# Patient Record
Sex: FEMALE | Race: BLACK OR AFRICAN AMERICAN | NOT HISPANIC OR LATINO | Employment: UNEMPLOYED | URBAN - METROPOLITAN AREA
[De-identification: names, ages, dates, MRNs, and addresses within clinical notes are randomized per-mention and may not be internally consistent; named-entity substitution may affect disease eponyms.]

---
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
## 2023-01-31 ENCOUNTER — TELEPHONE (OUTPATIENT)
Dept: FAMILY MEDICINE CLINIC | Facility: CLINIC | Age: 18
End: 2023-01-31

## 2023-01-31 NOTE — TELEPHONE ENCOUNTER
Date of Appointment:01/30/23    Reason Appointment was No Show: LVM    Was Appointment Rescheduled?:No    Was letter sent via [employer] or Mailed? :[facility]

## 2023-02-07 ENCOUNTER — OFFICE VISIT (OUTPATIENT)
Dept: URGENT CARE | Age: 18
End: 2023-02-07

## 2023-02-07 VITALS
OXYGEN SATURATION: 98 % | BODY MASS INDEX: 37.56 KG/M2 | HEIGHT: 64 IN | TEMPERATURE: 98.5 F | WEIGHT: 220 LBS | RESPIRATION RATE: 18 BRPM | HEART RATE: 68 BPM

## 2023-02-07 DIAGNOSIS — K52.9 ENTERITIS: Primary | ICD-10-CM

## 2023-02-07 DIAGNOSIS — J06.9 VIRAL UPPER RESPIRATORY TRACT INFECTION: ICD-10-CM

## 2023-02-07 DIAGNOSIS — R11.2 NAUSEA AND VOMITING, UNSPECIFIED VOMITING TYPE: ICD-10-CM

## 2023-02-07 RX ORDER — ONDANSETRON 8 MG/1
8 TABLET, ORALLY DISINTEGRATING ORAL EVERY 8 HOURS PRN
Qty: 20 TABLET | Refills: 0 | Status: SHIPPED | OUTPATIENT
Start: 2023-02-07

## 2023-02-07 NOTE — PROGRESS NOTES
[facility]        NAME: Tim Zacarias is a 21 y o  female  : 2005    MRN: [id number]  DATE: 2023  TIME: 12:43 PM    Assessment and Plan   Enteritis [K52 9]  1  Enteritis        2  Viral upper respiratory tract infection        3  Nausea and vomiting, unspecified vomiting type  ondansetron (ZOFRAN-ODT) 8 mg disintegrating tablet        --Suspect viral etiology  Address per below  Patient Instructions     --Rest and drink plenty of fluids  Milk, soda, and overly sugar or acidic beverages should be avoided, however  Plain water is acceptable, but dilute juice or a sports/electrolyte drink is preferred  --As tolerated, begin to eat small amounts of bland solids such as crackers, bread, rice, pasta, bananas, or yogurt  Fried, spicy, greasy, and overly acidic foods (such as tomato sauce) should be avoided for now  If you throw up after eating, wait at least 3-4 hours before making another attempt    --OTC Pepto-bismol may help with stomach discomfort, although it will likely turn your stools black  It should also be avoided in persons under age 25 or those with an aspirin allergy  --A prescription anti-nausea medication, such as Zofran, may also be provided  --OTC Immodium should be avoided  Although it will help decrease the amount of diarrhea, it has a tendency to keep the virus in your system longer, therefore prolonging the infection  Frequent use may also lead to rebound constipation  --Expect symptoms to last 3-7 days on average, followed by gradual improvement  It takes time for the virus to leave your system and for your GI tract to heal and resume normal functioning  --Monitor for signs and symptoms of dehydration including increased thirst, dry mouth, lightheadedness, as well as dark/infrequent/small amounts of urination  Should these occur, increase the amount of fluids you are drinking immediately    Should these continue, you should go immediately to the ER as you will likely need IV fluids  --Go to the ER if you experience increased abdominal pain, recurrent vomiting, significant blood in stool or emesis, or signs of dehydration (per above)  --Contact your PCP if your loose stools and stomach upset are still present after 7 days without showing signs of improvement  At this time, further evaluation may be warranted including stool cultures  Chief Complaint     Chief Complaint   Patient presents with   • Flu Symptoms     Bodyaches    back pain    headache    NVD   congestion    x 1 wk         Also reports bl wrist pain and limited rom          History of Present Illness       Here with complaints of headache, body aches, nasal congestion, rhinorrhea, cough, chills, abdominal discomfort, N/V/D x 1 week  Emesis is NBNB  Last episode yesterday  Stools are loose, watery, no melena or hematochezia  Generalized abdominal "pressure"  No fever the past few days  No cough  No neck stiffness  No OTC meds taken  Family member with recent flu  No COVID concerns  Review of Systems   Review of Systems   Constitutional: Negative for fever  HENT: Positive for sore throat  Negative for rhinorrhea  Respiratory: Positive for cough  Gastrointestinal: Positive for abdominal pain, diarrhea, nausea and vomiting  Negative for blood in stool  Musculoskeletal: Negative for myalgias  Neurological: Negative for headaches           Current Medications       Current Outpatient Medications:   •  acetaminophen (TYLENOL) 500 mg tablet, Take 1 tablet (500 mg total) by mouth every 6 (six) hours as needed for mild pain or headaches, Disp: 30 tablet, Rfl: 0  •  ibuprofen (MOTRIN) 400 mg tablet, Take 1 tablet (400 mg total) by mouth every 6 (six) hours as needed for mild pain or headaches, Disp: 30 tablet, Rfl: 0  •  ondansetron (ZOFRAN-ODT) 8 mg disintegrating tablet, Take 1 tablet (8 mg total) by mouth every 8 (eight) hours as needed for nausea or vomiting, Disp: 20 tablet, Rfl: 0    Current Allergies     Allergies as of 02/07/2023 - Reviewed 02/07/2023   Allergen Reaction Noted   • Coconut oil - food allergy Other (See Comments) 05/14/2019   • No active allergies  04/28/2017            The following portions of the patient's history were reviewed and updated as appropriate: allergies, current medications, past family history, past medical history, past social history, past surgical history and problem list      Past Medical History:   Diagnosis Date   • Anemia    • Generalized headaches    • Lateral epicondylitis of left elbow 5/12/2021   • Meningitis     2015   • Migraine    • Pre-diabetes    • Salter-Snow type III fracture of lower end of left tibia 12/13/2016   • Wrist sprain, left, initial encounter 12/12/2018       History reviewed  No pertinent surgical history  Family History   Problem Relation Age of Onset   • No Known Problems Mother    • No Known Problems Father    • Alzheimer's disease Family    • Diabetes Maternal Uncle         Mellitus         Medications have been verified  Objective   Pulse 68   Temp 98 5 °F (36 9 °C)   Resp 18   Ht 5' 4" (1 626 m)   Wt 99 8 kg (220 lb)   LMP 01/12/2023   SpO2 98%   BMI 37 76 kg/m²   Patient's last menstrual period was 01/12/2023  Physical Exam     Physical Exam  Constitutional:       General: She is not in acute distress  Appearance: Normal appearance  She is well-developed  She is not ill-appearing, toxic-appearing or diaphoretic  HENT:      Head: Normocephalic  Right Ear: Tympanic membrane, ear canal and external ear normal       Left Ear: Tympanic membrane, ear canal and external ear normal       Nose: Congestion and rhinorrhea present  Mouth/Throat:      Pharynx: No oropharyngeal exudate or posterior oropharyngeal erythema  Eyes:      General:         Right eye: No discharge  Left eye: No discharge     Cardiovascular:      Rate and Rhythm: Normal rate and regular rhythm  Pulses: Normal pulses  Heart sounds: Normal heart sounds  No murmur heard  Pulmonary:      Effort: Pulmonary effort is normal  No respiratory distress  Breath sounds: Normal breath sounds  No stridor  No wheezing, rhonchi or rales  Chest:      Chest wall: No tenderness  Abdominal:      General: Abdomen is flat  There is no distension  Palpations: Abdomen is soft  There is no mass  Tenderness: There is no abdominal tenderness  Musculoskeletal:      Cervical back: Neck supple  Lymphadenopathy:      Cervical: No cervical adenopathy  Skin:     General: Skin is warm and dry  Neurological:      Mental Status: She is alert and oriented to person, place, and time  Deep Tendon Reflexes: Reflexes are normal and symmetric     Psychiatric:         Mood and Affect: Mood normal

## 2023-02-07 NOTE — LETTER
February 7, 2023     Patient: Kingsley Iyer [place]   YOB: 2005   Date of Visit: 2/7/2023       To Whom it May Concern:    Qi Manjarrez was seen in my clinic on 2/7/2023  Please excuse from school this week due to illness  If you have any questions or concerns, please don't hesitate to call           Sincerely,          [facility]        CC: No Recipients

## 2023-02-07 NOTE — PATIENT INSTRUCTIONS
--Rest and drink plenty of fluids  Milk, soda, and overly sugar or acidic beverages should be avoided, however  Plain water is acceptable, but dilute juice or a sports/electrolyte drink is preferred  --As tolerated, begin to eat small amounts of bland solids such as crackers, bread, rice, pasta, bananas, or yogurt  Fried, spicy, greasy, and overly acidic foods (such as tomato sauce) should be avoided for now  If you throw up after eating, wait at least 3-4 hours before making another attempt    --OTC Pepto-bismol may help with stomach discomfort, although it will likely turn your stools black  It should also be avoided in persons under age 25 or those with an aspirin allergy  --A prescription anti-nausea medication, such as Zofran, may also be provided  --OTC Immodium should be avoided  Although it will help decrease the amount of diarrhea, it has a tendency to keep the virus in your system longer, therefore prolonging the infection  Frequent use may also lead to rebound constipation  --Expect symptoms to last 3-7 days on average, followed by gradual improvement  It takes time for the virus to leave your system and for your GI tract to heal and resume normal functioning  --Monitor for signs and symptoms of dehydration including increased thirst, dry mouth, lightheadedness, as well as dark/infrequent/small amounts of urination  Should these occur, increase the amount of fluids you are drinking immediately  Should these continue, you should go immediately to the ER as you will likely need IV fluids  --Go to the ER if you experience increased abdominal pain, recurrent vomiting, significant blood in stool or emesis, or signs of dehydration (per above)  --Contact your PCP if your loose stools and stomach upset are still present after 7 days without showing signs of improvement  At this time, further evaluation may be warranted including stool cultures